# Patient Record
Sex: MALE | Race: WHITE
[De-identification: names, ages, dates, MRNs, and addresses within clinical notes are randomized per-mention and may not be internally consistent; named-entity substitution may affect disease eponyms.]

---

## 2017-10-18 ENCOUNTER — HOSPITAL ENCOUNTER (OUTPATIENT)
Dept: HOSPITAL 80 - FIMAGING | Age: 77
End: 2017-10-18
Attending: INTERNAL MEDICINE
Payer: COMMERCIAL

## 2017-10-18 DIAGNOSIS — R09.02: ICD-10-CM

## 2017-10-18 DIAGNOSIS — R53.83: ICD-10-CM

## 2017-10-18 DIAGNOSIS — I50.9: ICD-10-CM

## 2017-10-18 DIAGNOSIS — J98.4: Primary | ICD-10-CM

## 2017-10-18 DIAGNOSIS — E11.9: ICD-10-CM

## 2018-03-15 ENCOUNTER — HOSPITAL ENCOUNTER (OUTPATIENT)
Dept: HOSPITAL 80 - BHFA | Age: 78
End: 2018-03-15
Attending: INTERNAL MEDICINE
Payer: COMMERCIAL

## 2018-03-15 DIAGNOSIS — I49.8: Primary | ICD-10-CM

## 2018-05-11 ENCOUNTER — HOSPITAL ENCOUNTER (OUTPATIENT)
Dept: HOSPITAL 80 - BHFA | Age: 78
End: 2018-05-11
Attending: INTERNAL MEDICINE
Payer: COMMERCIAL

## 2018-05-11 DIAGNOSIS — I49.3: Primary | ICD-10-CM

## 2018-07-11 ENCOUNTER — HOSPITAL ENCOUNTER (OUTPATIENT)
Dept: HOSPITAL 80 - BHFA | Age: 78
End: 2018-07-11
Attending: INTERNAL MEDICINE
Payer: COMMERCIAL

## 2018-07-11 DIAGNOSIS — I49.3: Primary | ICD-10-CM

## 2019-01-24 ENCOUNTER — HOSPITAL ENCOUNTER (EMERGENCY)
Dept: HOSPITAL 80 - FED | Age: 79
Discharge: HOME | End: 2019-01-24
Payer: COMMERCIAL

## 2019-01-24 VITALS — DIASTOLIC BLOOD PRESSURE: 103 MMHG | SYSTOLIC BLOOD PRESSURE: 156 MMHG

## 2019-01-24 DIAGNOSIS — I10: ICD-10-CM

## 2019-01-24 DIAGNOSIS — E11.9: ICD-10-CM

## 2019-01-24 DIAGNOSIS — J18.9: Primary | ICD-10-CM

## 2019-01-24 DIAGNOSIS — Z79.4: ICD-10-CM

## 2019-01-24 DIAGNOSIS — J44.0: ICD-10-CM

## 2019-01-24 DIAGNOSIS — E86.9: ICD-10-CM

## 2019-01-24 LAB — PLATELET # BLD: 236 10^3/UL (ref 150–400)

## 2019-01-24 PROCEDURE — 99285 EMERGENCY DEPT VISIT HI MDM: CPT

## 2019-01-24 PROCEDURE — 93005 ELECTROCARDIOGRAM TRACING: CPT

## 2019-01-24 PROCEDURE — 71275 CT ANGIOGRAPHY CHEST: CPT

## 2019-01-24 PROCEDURE — 96360 HYDRATION IV INFUSION INIT: CPT

## 2019-01-24 NOTE — CPEKG
Test Reason : OPEN

Blood Pressure : ***/*** mmHG

Vent. Rate : 119 BPM     Atrial Rate : 246 BPM

   P-R Int : 239 ms          QRS Dur : 090 ms

    QT Int : 339 ms       P-R-T Axes : 134 -42 -60 degrees

   QTc Int : 478 ms

 

Sinus tachycardia

Ventricular premature complex

Prolonged MI interval

Left axis deviation

Nonspecific repol abnormality, lateral leads

 

 

Confirmed by Sina Ramires ([id number]) on 1/24/2019 12:03:08 PM

 

Referred By: Sina Ramires           Confirmed By:Sina Ramires

## 2019-01-24 NOTE — EDPHY
H & P


Time Seen by Provider: 01/24/19 10:28


HPI/ROS: 





CHIEF COMPLAINT:  Neck pain





HISTORY OF PRESENT ILLNESS:  The patient presents the ED for evaluation of neck 

pain that began yesterday.  He describes a midline neck pain without associated 

numbness or weakness.  He has had a week history of fatigue and a mild cough.  

The patient did notice that his heart rate was increased today.  The patient 

does wear oxygen at night.  He denies any chest pain or difficulty breathing.  

He denies any fever.  The patient denies any abdominal pain, asymmetric calf 

pain or swelling or other acute complaints.





REVIEW OF SYSTEMS:


A comprehensive 10 point review of systems is otherwise negative aside from 

elements mentioned in the history of present illness.


Source: Patient


Exam Limitations: No limitations





- Medical/Surgical History


Hx Asthma: Yes


Hx Chronic Respiratory Disease: Yes


Hx Diabetes: Yes


Hx Cardiac Disease: Yes


Hx Renal Disease: No


Hx Cirrhosis: No


Hx Alcoholism: No


Hx HIV/AIDS: No


Hx Splenectomy or Spleen Trauma: No


Other PMH: DM2 COPD, sleep apnea, bronchiastas, PVD, HTN, Hypothyroid, Gout.





- Social History


Smoking Status: Former smoker





- Physical Exam


Exam: 





General Appearance:  Alert, no distress


Eyes:  Pupils equal and round no pallor or injection


ENT, Mouth:  Mucous membranes moist


Respiratory:  There are no retractions, lungs are clear to auscultation


Cardiovascular:  Regular rate and rhythm


Gastrointestinal:  Abdomen is soft and nontender, no masses, bowel sounds normal


Neurological:  5/5 strength noted all 4 extremities


Skin:  Warm and dry, no rashes


Musculoskeletal:  Neck is supple nontender


Extremities:  symmetrical, full range of motion








Constitutional: 


 Initial Vital Signs











Temperature (C)  36.4 C   01/24/19 10:26


 


Heart Rate  107 H  01/24/19 10:26


 


Respiratory Rate  20   01/24/19 10:26


 


Blood Pressure  112/85 H  01/24/19 10:26


 


O2 Sat (%)  91 L  01/24/19 10:26








 











O2 Delivery Mode               Room Air


 


O2 (L/minute)                  2














Allergies/Adverse Reactions: 


 





No Known Allergies Allergy (Verified 01/24/19 10:26)


 








Home Medications: 














 Medication  Instructions  Recorded


 


Albuterol [Proventil Inhaler HFA 1 - 2 puffs IH Q4H PRN 09/08/16





(*)]  


 


Allopurinol [Allopurinol 300  mg PO DAILY 09/08/16





(RX)]  


 


Atorvastatin Calcium [Lipitor 80 80 mg PO DAILY 09/08/16





mg]  


 


Cyanocobalamin [Vitamin B12 (*)] 1,000 mcg PO DAILY 09/08/16


 


Gabapentin [Neurontin 400 MG (*)] 400 mg PO BID 09/08/16


 


Levothyroxine [Synthroid 25 mcg 25 mcg PO DAILY06 09/08/16





(*)]  


 


glipiZIDE [Glipizide] 5 mg PO DAILY18 09/08/16


 


metFORMIN HCL [Glucophage 500 mg 1,000 mg PO BIDMEAL 09/08/16





(*)]  


 


Aspirin EC [Aspirin  mg (*)] 325 mg PO DAILY  tab 04/22/18


 


Clopidogrel Bisulfate [Plavix (*)] 75 mg PO DAILY #90 tab 04/22/18


 


Furosemide [Lasix 20 MG (*)] 20 mg PO DAILY #60 tab 04/22/18


 


Lisinopril [Zestril 40 mg (*)] 40 mg PO DAILY #60 tab 04/22/18


 


Metoprolol Tartrate [Lopressor 25 25 mg PO DAILY AT 10AM #60 tab 04/22/18





mg (*)]  


 


levOFLOXACIN [Levaquin] 500 mg PO DAILY #10 tablet 01/24/19














Medical Decision Making





- Diagnostics


EKG Interpretation: 





EKG:  Complete interpretation has been separately recorded in the [facility] 

archive.  Summary impression:  Sinus tachycardia, rate 117, PVC noted, 

nonspecific ST T wave changes noted


Imaging Results: 


 Imaging Impressions





Chest/Thorax CTA  01/24/19 12:53


Impression: 


1. Limited study without acute central or segmental pulmonary embolus.


2. Bibasilar consolidations, worse on the left, and increased in degree since 

2016. There is debris within the left lower lobe bronchus. Differential 

includes atelectasis and/or aspiration pneumonia. Would recommend follow-up 

imaging until resolution.


3. Prominent bilateral hilar nodes are likely reactive.


 


Findings and recommendations discussed with Sina Ramires  at 1401 hour, 1/24 /2019.











ED Course/Re-evaluation: 





Patient presents to the ED for evaluation of acute neck pain for the past 2 

days.  The patient does have a history of coronary artery disease status post 

and is concerned about the possibility of referred pain from heart attack.  

Additionally the patient has had several day history of cough, fatigue and 

malaise.  Patient did note slight increase in his oxygen requirement.  He does 

have oxygen at home.





Evaluation emergency department today consisted of an EKG which demonstrates a 

sinus tachycardia.





The patient's evaluation included a D-dimer which was elevated.  Given his 

complaints of dyspnea with slight tachycardia he was taken for CT scan of the 

chest which demonstrated no evidence of a PE but did demonstrate bilateral 

basilar infiltrates consistent with pneumonia.





I re-evaluated the patient at 2:30 p.m..  I informed him of the clinical and 

radiographic diagnosis of pneumonia.





The patient was offered admission to the hospital in the setting of his age, 

slight tachycardia and diagnosis of pneumonia however he is adamant about 

wanting to be discharged home.  He is a competent decision maker.  The patient 

has been informed that he can return to the emergency department at any time 

should he reconsider his decision not to be admitted to the hospital.  He will 

follow up with his primary care provider for recheck next week.





The patient will be discharged home with a 10 day prescription for Levaquin.








Differential Diagnosis: 





Differential diagnosis considered includes acute coronary syndrome, pulmonary 

embolism, pneumonia, dehydration, metabolic derangement





- Data Points


Laboratory Results: 


 Laboratory Results





 01/24/19 12:05 





 01/24/19 12:05 





 











  01/24/19 01/24/19 01/24/19





  12:11 12:05 12:05


 


WBC      





    


 


RBC      





    


 


Hgb      





    


 


Hct      





    


 


MCV      





    


 


MCH      





    


 


MCHC      





    


 


RDW      





    


 


Plt Count      





    


 


MPV      





    


 


Neut % (Auto)      





    


 


Lymph % (Auto)      





    


 


Mono % (Auto)      





    


 


Eos % (Auto)      





    


 


Baso % (Auto)      





    


 


Nucleat RBC Rel Count      





    


 


Absolute Neuts (auto)      





    


 


Absolute Lymphs (auto)      





    


 


Absolute Monos (auto)      





    


 


Absolute Eos (auto)      





    


 


Absolute Basos (auto)      





    


 


Absolute Nucleated RBC      





    


 


Immature Gran %      





    


 


Immature Gran #      





    


 


D-Dimer    1.37 ug/mLFEU H ug/mLFEU  





    (0.00-0.50)  


 


Sodium      142 mEq/L mEq/L





     (135-145) 


 


Potassium      3.5 mEq/L mEq/L





     (3.5-5.2) 


 


Chloride      104 mEq/L mEq/L





     () 


 


Carbon Dioxide      28 mEq/l mEq/l





     (22-31) 


 


Anion Gap      10 mEq/L mEq/L





     (6-14) 


 


BUN      25 mg/dL H mg/dL





     (7-23) 


 


Creatinine      1.0 mg/dL mg/dL





     (0.7-1.3) 


 


Estimated GFR      > 60 





    


 


Glucose      113 mg/dL H mg/dL





     () 


 


Calcium      9.5 mg/dL mg/dL





     (8.5-10.4) 


 


POC Troponin I  0.00 ng/mL ng/mL    





   (0.00-0.08)   














  01/24/19





  12:05


 


WBC  18.67 10^3/uL H 10^3/uL





   (3.80-9.50) 


 


RBC  5.11 10^6/uL 10^6/uL





   (4.40-6.38) 


 


Hgb  14.4 g/dL g/dL





   (13.7-17.5) 


 


Hct  44.1 % %





   (40.0-51.0) 


 


MCV  86.3 fL fL





   (81.5-99.8) 


 


MCH  28.2 pg pg





   (27.9-34.1) 


 


MCHC  32.7 g/dL g/dL





   (32.4-36.7) 


 


RDW  15.5 % H %





   (11.5-15.2) 


 


Plt Count  236 10^3/uL 10^3/uL





   (150-400) 


 


MPV  12.7 fL H fL





   (8.7-11.7) 


 


Neut % (Auto)  85.4 % H %





   (39.3-74.2) 


 


Lymph % (Auto)  7.1 % L %





   (15.0-45.0) 


 


Mono % (Auto)  6.7 % %





   (4.5-13.0) 


 


Eos % (Auto)  0.2 % L %





   (0.6-7.6) 


 


Baso % (Auto)  0.2 % L %





   (0.3-1.7) 


 


Nucleat RBC Rel Count  0.0 % %





   (0.0-0.2) 


 


Absolute Neuts (auto)  15.95 10^3/uL H 10^3/uL





   (1.70-6.50) 


 


Absolute Lymphs (auto)  1.32 10^3/uL 10^3/uL





   (1.00-3.00) 


 


Absolute Monos (auto)  1.26 10^3/uL H 10^3/uL





   (0.30-0.80) 


 


Absolute Eos (auto)  0.03 10^3/uL 10^3/uL





   (0.03-0.40) 


 


Absolute Basos (auto)  0.04 10^3/uL 10^3/uL





   (0.02-0.10) 


 


Absolute Nucleated RBC  0.00 10^3/uL 10^3/uL





   (0-0.01) 


 


Immature Gran %  0.4 % %





   (0.0-1.1) 


 


Immature Gran #  0.07 10^3/uL 10^3/uL





   (0.00-0.10) 


 


D-Dimer  





  


 


Sodium  





  


 


Potassium  





  


 


Chloride  





  


 


Carbon Dioxide  





  


 


Anion Gap  





  


 


BUN  





  


 


Creatinine  





  


 


Estimated GFR  





  


 


Glucose  





  


 


Calcium  





  


 


POC Troponin I  





  











Point of Care Test Results: 


 Chemistry











  01/24/19





  12:11


 


POC Troponin I  0.00 ng/mL ng/mL





   (0.00-0.08) 














Departure





- Departure


Disposition: Home, Routine, Self-Care


Clinical Impression: 


 Pneumonia





Condition: Good


Instructions:  Pneumonia (ED)


Additional Instructions: 


1. Please take antibiotics as directed for next 10 days.


2. You have been offered admission to the hospital and declined.  Please return 

to the ED for any worsening respiratory symptoms or other concerns.


3. Please schedule a follow-up appointment with your primary care provider for 

a recheck next week.  


Referrals: 


Robert Darby MD [Primary Care Provider] - As per Instructions


Prescriptions: 


levOFLOXACIN [Levaquin] 500 mg PO DAILY #10 tablet

## 2019-04-11 ENCOUNTER — HOSPITAL ENCOUNTER (OUTPATIENT)
Dept: HOSPITAL 80 - FCATH | Age: 79
Setting detail: OBSERVATION
LOS: 1 days | Discharge: HOME | End: 2019-04-12
Attending: INTERNAL MEDICINE | Admitting: INTERNAL MEDICINE
Payer: COMMERCIAL

## 2019-04-11 DIAGNOSIS — I25.10: ICD-10-CM

## 2019-04-11 DIAGNOSIS — I48.92: Primary | ICD-10-CM

## 2019-04-11 DIAGNOSIS — Z95.5: ICD-10-CM

## 2019-04-11 LAB
INR PPP: 1.38 (ref 0.83–1.16)
PLATELET # BLD: 171 10^3/UL (ref 150–400)
PROTHROMBIN TIME: 16.4 SEC (ref 12–15)

## 2019-04-11 PROCEDURE — 93621 COMP EP EVL L PAC&REC C SINS: CPT

## 2019-04-11 PROCEDURE — 93613 INTRACARDIAC EPHYS 3D MAPG: CPT

## 2019-04-11 PROCEDURE — C1732 CATH, EP, DIAG/ABL, 3D/VECT: HCPCS

## 2019-04-11 PROCEDURE — 4A023FZ MEASUREMENT OF CARDIAC RHYTHM, PERCUTANEOUS APPROACH: ICD-10-PCS | Performed by: INTERNAL MEDICINE

## 2019-04-11 PROCEDURE — C1893 INTRO/SHEATH, FIXED,NON-PEEL: HCPCS

## 2019-04-11 PROCEDURE — 02K83ZZ MAP CONDUCTION MECHANISM, PERCUTANEOUS APPROACH: ICD-10-PCS | Performed by: INTERNAL MEDICINE

## 2019-04-11 PROCEDURE — 93005 ELECTROCARDIOGRAM TRACING: CPT

## 2019-04-11 PROCEDURE — B245ZZ4 ULTRASONOGRAPHY OF LEFT HEART, TRANSESOPHAGEAL: ICD-10-PCS | Performed by: INTERNAL MEDICINE

## 2019-04-11 PROCEDURE — 93653 COMPRE EP EVAL TX SVT: CPT

## 2019-04-11 PROCEDURE — C1730 CATH, EP, 19 OR FEW ELECT: HCPCS

## 2019-04-11 PROCEDURE — 02583ZZ DESTRUCTION OF CONDUCTION MECHANISM, PERCUTANEOUS APPROACH: ICD-10-PCS | Performed by: INTERNAL MEDICINE

## 2019-04-11 PROCEDURE — 93312 ECHO TRANSESOPHAGEAL: CPT

## 2019-04-11 RX ADMIN — APIXABAN SCH MG: 5 TABLET, FILM COATED ORAL at 17:34

## 2019-04-11 RX ADMIN — APIXABAN SCH MG: 5 TABLET, FILM COATED ORAL at 20:27

## 2019-04-11 RX ADMIN — GABAPENTIN SCH MG: 400 CAPSULE ORAL at 20:27

## 2019-04-11 NOTE — EPPROC
Electrophysiology Procedure Note: 





Date:  4/11/2019





:  Elijah Veliz MD





Procedures:


Comprehensive EP study and catheter ablation of SVT -10286


CS catheter placement/pacing -10105


3D electro anatomic mapping -22005


Transesophageal echo-97128





Indications:  79-year-old male with typical atrial flutter.





Techniques:  Following informed consent, the patient was brought to the EP lab 

in a fasting nonsedated state, in typical atrial flutter rhythm.  General 

anesthesia was provided by the anesthesiology service.  Preprocedure 

transesophageal echocardiogram was performed, showing absence of left atrial 

appendage thrombus, and normal CTI anatomy (see formal report for full details)

.  Bilateral groins were prepped and draped in usual sterile fashion.  1% 

lidocaine was infiltrated over the right femoral vessels.  Under ultrasound 

guidance, vascular access was obtained x2 in the right femoral vein with 

placement of 8 Sinhala and SR 0 sheaths.  Under Carto guidance, a decapolar 

catheter was advanced to the CS.  A Smart Touch SF ablation catheter was 

advanced to the right atrium.  A Carto anatomic map of the right atrium, 

proximal CS was created.  The his position was annotated (HV=42ms). 


Baseline rhythm was typical appearing atrial flutter, with CSp>d activation 

pattern, QZF=385lm.  Entrainment was performed from the CTI-this showed 

concealed fusion and PPI-GDK=869-360er=9iy, confirming typical CTI dependent 

atrial flutter.


RF ablation was performed at 35 w power along the CTI.  During ablation, there 

was slowing of the tachycardia cycle length, followed by flutter termination.  

Pacing was initiated from CS proximal bipole, and lateral RA mapping confirmed 

transisthmus block across the CTI.  Differential pacing from the lateral RA 

confirmed bidirectional block. TIT= 223ms med-lat, 194ms lat-med.


At the completion of the procedure, all catheters were removed.  A temporary 

hemostasis suture was applied to the right groin access site, and sheaths were 

aspirated then removed; manual pressure was held until hemostasis.  The patient 

tolerated the procedure well.





EBL:  Minimal


Complications:  None





Assessment:  Typical CTI dependent atrial flutter, status post successful CTI 

ablation





Plan:


Bedrest 6 hr


Remove hemostasis suture at the completion of bedrest


Resume Eliquis at the completion of bedrest, continue for 1 month at a minimum


Right groin precautions for 10 days


Patient Problems: 


 Problems











Problem Status Onset


 


Chronic Disease Mgmt/Transitional Care Acute  


 


Diarrhea Acute  


 


Pain in lower limb Acute  


 


Pneumonia Acute

## 2019-04-11 NOTE — PDGENHP
History & Physical


Chief Complaint: typical AFL


History of Present Illness: typical AFL


Relevant Physical Exam: A+Ox4, no focal deficits, regular tachycardia/no MRG, 

CTAB


Cardiorespiratory Assessment: typical AFL -> GETA/KATIE/CTI abltaion

## 2019-04-11 NOTE — CPEKG
Test Reason : OPEN

Blood Pressure : ***/*** mmHG

Vent. Rate : 081 BPM     Atrial Rate : 082 BPM

   P-R Int : 167 ms          QRS Dur : 098 ms

    QT Int : 401 ms       P-R-T Axes : 077 003 019 degrees

   QTc Int : 466 ms

 

Sinus rhythm

Anteroseptal infarct, old

 

Confirmed by Jarred Saucedo ([id number]) on 4/11/2019 8:15:50 PM

 

Referred By: Ty Veliz           Confirmed By:Jarred Saucedo

## 2019-04-11 NOTE — CPEKG
Test Reason : OPEN

Blood Pressure : ***/*** mmHG

Vent. Rate : 109 BPM     Atrial Rate : 250 BPM

   P-R Int : 216 ms          QRS Dur : 101 ms

    QT Int : 340 ms       P-R-T Axes : 162 -45 -02 degrees

   QTc Int : 458 ms

 

Atrial flutter

LAD, consider left anterior fascicular block

 

 

Confirmed by Jarred Saucedo ([id number]) on 4/11/2019 7:39:55 PM

 

Referred By: Ty Veliz           Confirmed By:Jarred Saucedo

## 2019-04-11 NOTE — ECHO
[email or web address]





[address] [address] 

Main: [phone] 



Echocardiography Examination 

Transesophageal 



Name:           NANY ROMO                   MR#:

[id number]

Study Date:     04/11/2019                           Study Time:

10:10 AM

YOB: 1940                           Age:          79

year(s)

Height:            (  )                              Weight:         (

)

BSA:                                                 Gender:

Male

Examination:    KATIE                                  Contrast: 

Image Quality:                                       Rhythm: 

Heart Rate:                                          BP:            / 

Indication:     Pre EP 



Procedure Staff 

Referring Physician: 

Echocardiographer:         Marshall Lopez RDCS 

Reading Physician:         Ty Veliz MD 

Requesting Provider: 

Ordering Physician:        Ty Veliz MD 

Indication:                Pre EP 



Measurements 

Chambers                                          TV/PV 

Label                 Value       Normal Value          Label

Value       Normal Value



RA Pressure           5 mmHg 

RVSP                  23 mmHg 

TR Pmax               18 mmHg 

TR Vmax               2.11 m/s 



Conclusions 



Left Atrium Appendage: 

 No thrombus is identified.  



IAS: 

 An agitated saline study was performed and was negative for

intracardiac shunting.



Findings 



Left Ventricle: 

The EF is visually estimated to be 45 %.  

Right Ventricle: 



Patient: NANY ROMO                  MRN: [id number]

Study Date: 04/11/2019   Page 1 of 2

10:10 AM 









Right ventricular systolic function is mildly reduced.  

Left Atrium Appendage: 

There is a diminished PW-Doppler flow profile. Good color flow doppler

in the left atrial appendage. No thrombus is

identified.  

IAS: 

An agitated saline study was performed and was negative for

intracardiac shunting.

Mitral Valve: 

Mitral valve appears structurally normal. Mild mitral regurgitation.  

Aortic Valve: 

No significant aortic valve regurgitation. Aortic leaflets exhibit

mild calcification. The aortic valve is trileaflet.

Tricuspid Valve: 

Mild tricuspid regurgitation. Right Ventricular systolic pressure is

measured at 23 mmHg.

Pulmonic Valve: 

Pulmonic leaflets are normal in appearance and function.  

Aorta: 

Mild atheroma in the descending aorta.  

Pericardium: 

No pericardial effusion.  



Exam Details 

Procedure Ordered:         KATIE 



Electronically signed by Ty Veliz MD on 04/11/2019 at 04:57

PM

(No Signature Object) 



Patient: NANY ROMO                  MRN: [id number]

Study Date: 04/11/2019   Page 2 of 2

10:10 AM 







[id number]

## 2019-04-12 VITALS — SYSTOLIC BLOOD PRESSURE: 140 MMHG | DIASTOLIC BLOOD PRESSURE: 73 MMHG

## 2019-04-12 LAB — CK SERPL-CCNC: 42 IU/L (ref 0–224)

## 2019-04-12 RX ADMIN — APIXABAN SCH MG: 5 TABLET, FILM COATED ORAL at 09:48

## 2019-04-12 RX ADMIN — GABAPENTIN SCH MG: 400 CAPSULE ORAL at 09:48

## 2019-04-12 NOTE — ASMTLACE
LACE

 

Length of stay for            Answers:  Less than 1 day                       

current admission                                                             

Acuity / Level of             Answers:  No                                    

Care: Did the patient                                                         

have an inpatient                                                             

admission?                                                                    

Comorbidities - select        Answers:  Chronic pulmonary disease             

all that apply                                                                

                                        Diabetes (uncontrolled or             

                                        controlled)                           

                                        Opioid dependence                     

                                        / Chronic pain                        

                                        Peripheral vascular                   

                                        disease                               

                                        Other                         Notes:  HTN; Hypothyroid

# of Emergency department     Answers:  1-2                                   

visits in the last 6                                                          

months                                                                        

Score: 10

 

Date Signed:  04/12/2019 10:00 AM

Electronically Signed By:Britt Bishop RN

## 2019-04-12 NOTE — CPEKG
Test Reason : OPEN

Blood Pressure : ***/*** mmHG

Vent. Rate : 067 BPM     Atrial Rate : 068 BPM

   P-R Int : 159 ms          QRS Dur : 104 ms

    QT Int : 545 ms       P-R-T Axes : 079 -04 -73 degrees

   QTc Int : 576 ms

 

Sinus rhythm

Nonspecific T abnormalities, lateral leads

Prolonged QT interval

 

Confirmed by Jarred Saucedo ([id number]) on 4/12/2019 5:48:55 PM

 

Referred By: Ty Veliz           Confirmed By:Jarred Saucedo

## 2019-04-12 NOTE — ASDISCHSUM
----------------------------------------------

Discharge Information

----------------------------------------------

Plan Status:Home with No Needs                       Medically Cleared to Leave:04/12/2019

Discharge Date:04/12/2019                            CM D/C Disposition:Home, Routine, Self-Care

ADT D/C Disposition:Home, Routine, Self-Care         Projected Discharge Date:04/12/2019

Transportation at D/C:Family                         Discharge Delay Reason:

Follow-Up Date:04/12/2019                            Discharge Slot:

Final Diagnosis:

----------------------------------------------

Placement Information

----------------------------------------------

----------------------------------------------

Patient Contact Information

----------------------------------------------

Contact Name:QUIRINO                            Relationship:Wife

Address:[address] [place] [address]                      Home [phone]

                                                     Work Phone:

City:[place]                                           [facility] Phone:

State/Zip Code:[place] [address]                              Email:

----------------------------------------------

Financial Information

----------------------------------------------

Financial Class:Medicare

Primary Plan Desc:MEDICARE OUTPATIENT                Primary Plan Number:[id number]

Secondary Plan Desc:RAFAEL                  Secondary Plan Number:[id number]

 

 

----------------------------------------------

Assessment Information

----------------------------------------------

----------------------------------------------

LACE

----------------------------------------------

LACE

 

Length of stay for            Answers:  Less than 1 day                       

current admission                                                             

Acuity / Level of             Answers:  No                                    

Care: Did the patient                                                         

have an inpatient                                                             

admission?                                                                    

Comorbidities - select        Answers:  Chronic pulmonary disease             

all that apply                                                                

                                        Diabetes (uncontrolled or             

                                        controlled)                           

                                        Opioid dependence                     

                                        / Chronic pain                        

                                        Peripheral vascular                   

                                        disease                               

                                        Other                         Notes:  HTN; Hypothyroid

# of Emergency department     Answers:  1-2                                   

visits in the last 6                                                          

months                                                                        

Score: 10

 

Date Signed:  04/12/2019 10:00 AM

Electronically Signed By:Britt Bishop RN

 

 

----------------------------------------------

Intervention Information

----------------------------------------------

## 2019-04-12 NOTE — GDS
[f 
rep st]



                                                             DISCHARGE SUMMARY





DISCHARGE DIAGNOSES:  

1.  Typical atrial flutter.

2.  Ablation of atrial flutter.

3.  Coronary artery disease.



BRIEF HISTORY:  This is an 79-year-old man with relatively newly diagnosed 
atrial flutter with ventricular rates up to 150 beats per minute.  He also has 
a history of coronary artery disease with an LAD and circumflex stent placed 
about 1 year ago.  He has a CHADS/VASc of 4 and is taking Eliquis.



HOSPITAL COURSE:  Dr. Veliz performed ablation of typical CTI-dependent 
atrial flutter without complications.  The patient did well overnight.  He has 
not had any arrhythmias other than PVCs.  He has not had any chest pain, 
pressure, or tightness.  No shortness of breath.  His O2 saturation has come up 
and is 92% on room air at the time of discharge. 



TESTING DONE:  KATIE demonstrated ejection fraction of 45%.  No thrombus in left 
atrial appendage and mild MR.  EKG demonstrates sinus rhythm with nonspecific ST
-T wave changes.



LAB WORK:  WBC is 8.77, hemoglobin 13, hematocrit 40.8, platelets 171.  Sodium 
is 138, potassium 4.3, chloride 95, bicarb 25, BUN 31, creatinine 1.0, glucose 
125.  CK is 42, troponin is 0.4.



PHYSICAL EXAM:  VITAL SIGNS:  Blood pressure is 140/73, pulse is 67, 
respirations 10, temperature is 36.3, O2 saturation 92% on room air.  GENERAL:  
He is alert and oriented, lying in bed, in no acute distress.  CARDIAC:  
Regular rate and rhythm without a murmur, rub, or gallop.  LUNGS:  Clear to 
auscultation.  ABDOMEN:  Soft and nontender.  GROIN SITE:  Without bleeding or 
hematoma.  One stitch was removed without problem.  EXTREMITIES:  Warm.  No 
discoloration.  No lower extremity edema.  Bilateral +1 pedal pulses.



DISCHARGE INSTRUCTIONS:  Post ablation activity restrictions/groin precautions 
were reviewed verbally and given to him written at the time of discharge. He 
was given an incentive spirometer with instructions to use every 2 hours while 
awake for next few days. 



DISCHARGE MEDICATIONS:  Please see discharge medication reconciliation.  Of note
, he has resumed Eliquis and will take that for at least 1 month or until 
followup.



FOLLOWUP:  On May 10th with Dr. Veliz at 11:30.



ADDENDUM Elijah Veliz MD - Agree with Ms Singh's assessment and plan.  
Uneventful postop observation status post catheter ablation of typical atrial 
flutter.





Job #:  [id number]

MTDD